# Patient Record
(demographics unavailable — no encounter records)

---

## 2024-12-07 NOTE — ASSESSMENT
[FreeTextEntry1] : Dr. Campa reviewed with RHEA her CPAP compliance in office today. RHEA is noncompliant but benefiting from CPAP usage, and she did not bring her CPAP with her on vacation. Dr. Campa strongly counseled Rhea on generally increasing her CPAP usage and making sure to take it with her on vacation.   Dr. Campa discussed with RHEA dangers of leaving their sleep apnea untreated, including excessive day time sleepiness, liver complications, type 2 diabetes, HTN, impaired decision making especially while driving, increasing the risk of car accidents, early on-set dementia, increased risk of stroke and cardiac arrythmia etc.  Rhea should follow up with Dr. Campa in 2 months.

## 2024-12-07 NOTE — HISTORY OF PRESENT ILLNESS
[FreeTextEntry1] : HARRISON DAMICO is a 39 year old female, with history of SYLVAIN, who presents to the office for follow up evaluation. HARRISON is overall feeling well at this time; no respiratory or sleep complaints.

## 2024-12-07 NOTE — ADDENDUM
[FreeTextEntry1] : I, Adriano Crowe, acted solely as a scribe for Dr. Khushboo Campa D.O. on this date 12/05/2024.   All medical record entries made by the Scribe were at my, Dr. Khushboo Campa D.O., direction and personally dictated by me on 12/05/2024. I have reviewed the chart and agree that the record accurately reflects my personal performance of the history, physical exam, assessment and plan. I have also personally directed, reviewed, and agreed with the chart.